# Patient Record
Sex: FEMALE | Race: WHITE
[De-identification: names, ages, dates, MRNs, and addresses within clinical notes are randomized per-mention and may not be internally consistent; named-entity substitution may affect disease eponyms.]

---

## 2019-04-11 ENCOUNTER — HOSPITAL ENCOUNTER (EMERGENCY)
Dept: HOSPITAL 97 - ER | Age: 59
Discharge: HOME | End: 2019-04-11
Payer: COMMERCIAL

## 2019-04-11 DIAGNOSIS — R91.8: ICD-10-CM

## 2019-04-11 DIAGNOSIS — Z88.0: ICD-10-CM

## 2019-04-11 DIAGNOSIS — J15.8: Primary | ICD-10-CM

## 2019-04-11 DIAGNOSIS — F17.210: ICD-10-CM

## 2019-04-11 LAB
ALBUMIN SERPL BCP-MCNC: 3.5 G/DL (ref 3.4–5)
ALP SERPL-CCNC: 129 U/L (ref 45–117)
ALT SERPL W P-5'-P-CCNC: 21 U/L (ref 12–78)
AST SERPL W P-5'-P-CCNC: 16 U/L (ref 15–37)
BUN BLD-MCNC: 16 MG/DL (ref 7–18)
GLUCOSE SERPLBLD-MCNC: 115 MG/DL (ref 74–106)
HCT VFR BLD CALC: 36 % (ref 36–45)
INR BLD: 1.14
LYMPHOCYTES # SPEC AUTO: 1.3 K/UL (ref 0.7–4.9)
MAGNESIUM SERPL-MCNC: 2.2 MG/DL (ref 1.8–2.4)
NT-PROBNP SERPL-MCNC: 662 PG/ML (ref ?–125)
PMV BLD: 8.5 FL (ref 7.6–11.3)
POTASSIUM SERPL-SCNC: 3.5 MMOL/L (ref 3.5–5.1)
RBC # BLD: 4.33 M/UL (ref 3.86–4.86)
TROPONIN (EMERG DEPT USE ONLY): < 0.02 NG/ML (ref 0–0.04)

## 2019-04-11 PROCEDURE — 71046 X-RAY EXAM CHEST 2 VIEWS: CPT

## 2019-04-11 PROCEDURE — 80076 HEPATIC FUNCTION PANEL: CPT

## 2019-04-11 PROCEDURE — 94640 AIRWAY INHALATION TREATMENT: CPT

## 2019-04-11 PROCEDURE — 81003 URINALYSIS AUTO W/O SCOPE: CPT

## 2019-04-11 PROCEDURE — 85610 PROTHROMBIN TIME: CPT

## 2019-04-11 PROCEDURE — 83735 ASSAY OF MAGNESIUM: CPT

## 2019-04-11 PROCEDURE — 85025 COMPLETE CBC W/AUTO DIFF WBC: CPT

## 2019-04-11 PROCEDURE — 93005 ELECTROCARDIOGRAM TRACING: CPT

## 2019-04-11 PROCEDURE — 96361 HYDRATE IV INFUSION ADD-ON: CPT

## 2019-04-11 PROCEDURE — 96374 THER/PROPH/DIAG INJ IV PUSH: CPT

## 2019-04-11 PROCEDURE — 80048 BASIC METABOLIC PNL TOTAL CA: CPT

## 2019-04-11 PROCEDURE — 84484 ASSAY OF TROPONIN QUANT: CPT

## 2019-04-11 PROCEDURE — 36415 COLL VENOUS BLD VENIPUNCTURE: CPT

## 2019-04-11 PROCEDURE — 99285 EMERGENCY DEPT VISIT HI MDM: CPT

## 2019-04-11 PROCEDURE — 71275 CT ANGIOGRAPHY CHEST: CPT

## 2019-04-11 PROCEDURE — 85379 FIBRIN DEGRADATION QUANT: CPT

## 2019-04-11 PROCEDURE — 81025 URINE PREGNANCY TEST: CPT

## 2019-04-11 PROCEDURE — 83880 ASSAY OF NATRIURETIC PEPTIDE: CPT

## 2019-04-11 NOTE — EKG
Test Date:    2019-04-11               Test Time:    13:38:21

Technician:   JONEL                                     

                                                     

MEASUREMENT RESULTS:                                       

Intervals:                                           

Rate:         83                                     

OH:           104                                    

QRSD:         88                                     

QT:           360                                    

QTc:          423                                    

Axis:                                                

P:            40                                     

OH:           104                                    

QRS:          69                                     

T:            49                                     

                                                     

INTERPRETIVE STATEMENTS:                                       

                                                     

Sinus rhythm with short OH

Otherwise normal ECG

Compared to ECG 01/31/2011 14:34:09

Ventricular premature complex(es) no longer present

T-wave abnormality no longer present



Electronically Signed On 04-11-19 17:12:58 CDT by Maxime Solomon

## 2019-04-11 NOTE — RAD REPORT
EXAM DESCRIPTION:  Sanjiv Pa And Lat (2 Views)4/11/2019 3:13 pm

 

CLINICAL HISTORY:  Cough

 

COMPARISON:  March 2018

 

FINDINGS:  Right middle lobe opacity has decreased in size. Mild right upper lobe opacity has develop
ed.

 

Right hilar and mediastinal lymphadenopathy present.

 

The heart is normal size

 

IMPRESSION:  Right lung opacities likely represent pneumonia.

 

Right hilar and mediastinal lymphadenopathy may be secondary to neoplasm, inflammation or infection

## 2019-04-11 NOTE — EDPHYS
Physician Documentation                                                                           

 [facility]                                                                 

Name: Radha Nieves                                                                               

Age: 58 yrs                                                                                       

Sex: Female                                                                                       

: 1960                                                                                   

MRN: [id number]                                                                                   

Arrival Date: 2019                                                                          

Time: 12:15                                                                                       

Account#: [id number]                                                                            

Bed 26                                                                                            

Private MD:                                                                                       

ED Physician David Gil                                                                         

HPI:                                                                                              

                                                                                             

13:25 This 58 yrs old  Female presents to ER via Ambulatory with complaints of       cp  

      Cough, Chest Congestion.                                                                    

13:25 The patient or guardian reports cough, that is intermittent, with productive sputum,    cp  

      that is green, blood streaked.                                                              

13:25 Onset: The symptoms/episode began/occurred 2 month(s) ago. Severity of symptoms: in the cp  

      emergency department the symptoms are actually worse, mildly. Associated signs and          

      symptoms: Pertinent positives: chest pain, with cough, shortness of breath. Patient         

      reports she has bonny her PCP, DR Lieberman, for same complaints and has been on antibiotics       

      and steroids to include cefdinir. Patient reports she is currently taking Levaquin and      

      oral steroids and last dose is tomorrow.                                                    

                                                                                                  

Historical:                                                                                       

- Allergies:                                                                                      

12:32 PENICILLINS;                                                                            sv  

- PMHx:                                                                                           

12:32 Thyroid problem;                                                                        sv  

- PSHx:                                                                                           

12:32 Hysterectomy; Cholecystectomy; spinal cyst removed;                                     sv  

                                                                                                  

- Immunization history:: Adult Immunizations up to date.                                          

- Social history:: Smoking status: Patient uses tobacco products, smokes one pack                 

  cigarettes per day.                                                                             

- Ebola Screening: : No symptoms or risks identified at this time.                                

                                                                                                  

                                                                                                  

ROS:                                                                                              

13:30 Constitutional: Negative for body aches, chills, fever, poor PO intake.                 cp  

13:30 Eyes: Negative for injury, pain, redness, and discharge.                                cp  

13:30 ENT: Negative for drainage from ear(s), ear pain, sore throat, difficulty swallowing,       

      difficulty handling secretions.                                                             

13:30 Cardiovascular: Positive for chest pain, with cough, Negative for edema, palpitations.      

13:30 Respiratory: Positive for cough, green and blood streaked, shortness of breath, on          

      exertion.                                                                                   

13:30 Abdomen/GI: Negative for abdominal pain, nausea, vomiting, and diarrhea, constipation.      

13:30 Back: Negative for pain at rest, pain with movement.                                        

13:30 : Negative for urinary symptoms.                                                          

13:30 Skin: Negative for cellulitis, rash.                                                        

13:30 Neuro: Negative for altered mental status, headache, weakness.                              

13:30 All other systems are negative.                                                             

                                                                                                  

Exam:                                                                                             

13:40 Constitutional: The patient appears in no acute distress, alert, awake,                 cp  

      non-diaphoretic, non-toxic, well developed, well nourished.                                 

13:40 Head/Face:  Normocephalic, atraumatic. Eyes:  Pupils equal round and reactive to light, cp  

      extra-ocular motions intact.  Lids and lashes normal.  Conjunctiva and sclera are           

      non-icteric and not injected.  Cornea within normal limits.  Periorbital areas with no      

      swelling, redness, or edema. ENT:  Nares patent. No nasal discharge, no septal              

      abnormalities noted.  Tympanic membranes are normal and external auditory canals are        

      clear.  Oropharynx with no redness, swelling, or masses, exudates, or evidence of           

      obstruction, uvula midline.  Mucous membranes moist.                                        

13:40 Neck: ROM/movement: is normal, is supple, without pain, no range of motions                 

      limitations, no meningismus, no nuchal rigidity.                                            

13:40 Chest/axilla: Inspection: normal, Palpation: is normal, no crepitus, no tenderness.         

13:40 Cardiovascular: Rate: normal, Rhythm: regular, Pulses: Pulses are 2+ in right radial        

      artery and left radial artery. Heart sounds: murmur, not appreciated, Edema: is not         

      appreciated, JVD: is not appreciated.                                                       

13:40 Respiratory: the patient does not display signs of respiratory distress,  Respirations:     

      labored breathing, is not present, intercostal retractions, are absent, splinting, is       

      not noted, tachypnea, is not appreciated, Breath sounds: bronchial sounds, that are         

      mild, are heard diffusely, decreased breath sounds, are not appreciated, stridor, is        

      not appreciated, wheezing: that is mild.                                                    

13:40 Abdomen/GI: Inspection: abdomen appears normal, Palpation: abdomen is soft and              

      non-tender, in all quadrants.                                                               

13:40 Back: pain, is absent, ROM is normal.                                                       

13:40 Skin: no rash present.                                                                      

13:40 Neuro: Orientation: to person, place \T\ time. Mentation: is normal, Cerebellar function:   

      is grossly normal, Motor: moves all fours, strength is normal, Sensation: is normal.        

13:45 ECG was reviewed by the Attending Physician.                                            cp  

                                                                                                  

Vital Signs:                                                                                      

12:32  / 65; Pulse 99; Resp 24; Temp 98.7(O); Pulse Ox 99% ; Weight 70.31 kg; Height 5  sv  

      ft. 7 in. (170.18 cm); Pain 4/10;                                                           

13:30  / 71; Pulse 91; Resp 19 S; Pulse Ox 100% on R/A;                                 ca1 

14:20  / 51; Pulse 76; Resp 16 S; Pulse Ox 100% on R/A;                                 ca1 

16:02  / 61; Pulse 100; Resp 19 S; Pulse Ox 97% on R/A;                                 ca1 

16:38 BP 96 / 50; Pulse 98; Resp 19 S; Pulse Ox 99% on R/A;                                   ca1 

16:43  / 56; Pulse 102; Resp 19 S; Pulse Ox 100% on R/A;                                ca1 

17:10  / 61; Pulse 100; Resp 16 S; Pulse Ox 100% on R/A;                                ca1 

17:30  / 62; Pulse 99; Resp 19 S; Pulse Ox 99% on R/A;                                  ca1 

12:32 Body Mass Index 24.28 (70.31 kg, 170.18 cm)                                             sv  

                                                                                                  

MDM:                                                                                              

13:05 Patient medically screened.                                                             cp  

13:45 Differential Diagnosis: Bronchitis Influenza Asthma Exacerbation Pneumonia Other lung   cp  

      carcinoma, pulmonary embolism.                                                              

16:25 Data reviewed: vital signs, nurses notes, lab test result(s), EKG, radiologic studies,  cp  

      CT scan, plain films.                                                                       

16:25 Test interpretation: by ED physician or midlevel provider: ECG, plain radiologic        cp  

      studies.                                                                                    

17:00 Physician consultation: Corazon Serrano MD was called at 16:45, was contacted at 17:00,   cp  

      regarding admission, to the medical/surgical unit. patient's condition, reports patient     

      can f/u Monday with DR Jeter to discuss today's findings in office on Monday,            

      4-. Dr Jeter recommends referral to cardiothoracic surgery to perform biopsy      

      of bronchial mass.                                                                          

17:20 Response to treatment: the patient's symptoms have mildly improved after treatment.     cp  

17:20 ED course: VSS. Discussed results of today's labs and radiology tests and need for      cp  

      urgent f/u. Patient requests discharge to home instead of an attempt to transfer to [facility] in the [facility] for continued care. Will prescribe oral clindamycin and        

      discharge to home for continued monitoring.                                                 

                                                                                                  

                                                                                             

13:19 Order name: Basic Metabolic Panel; Complete Time: 14:39                                 cp  

                                                                                             

14:39 Interpretation: Normal except: GLUC 115; GFR 61.                                        cp  

                                                                                             

13:19 Order name: CBC with Diff; Complete Time: 14:39                                         cp  

                                                                                             

14:39 Interpretation: Normal except: WBC 11.9; HGB 11.8; ; MARCOS% 85.4; LYM% 10.7; NEUT  cp  

      A 10.1.                                                                                     

                                                                                             

13:19 Order name: LFT's; Complete Time: 14:39                                                 cp  

                                                                                             

13:19 Order name: Magnesium; Complete Time: 14:39                                             cp  

                                                                                             

13:19 Order name: NT PRO-BNP; Complete Time: 14:39                                            cp  

                                                                                             

13:19 Order name: PT-INR; Complete Time: 14:39                                                cp  

                                                                                             

13:19 Order name: Troponin (emerg Dept Use Only); Complete Time: 14:39                        cp  

                                                                                             

13:19 Order name: D-Dimer; Complete Time: 14:39                                               cp  

                                                                                             

14:40 Order name: CT Chest For PE Angio; Complete Time: 16:21                                 cp  

                                                                                             

15:09 Order name: Chest Pa And Lat (2 Views); Complete Time: 16:21                            EDMS

                                                                                             

15:42 Order name: Urine Dipstick--Ancillary (enter results); Complete Time: 16:21             eb  

                                                                                             

15:42 Order name: Urine Pregnancy--Ancillary (enter results); Complete Time: 16:21            eb  

                                                                                             

13:19 Order name: EKG; Complete Time: 13:20                                                   cp  

                                                                                             

13:19 Order name: Cardiac monitoring; Complete Time: 13:34                                    cp  

                                                                                             

13:19 Order name: EKG - Nurse/Tech; Complete Time: 13:42                                      cp  

                                                                                             

13:19 Order name: IV Saline Lock; Complete Time: 13:34                                        cp  

                                                                                             

13:19 Order name: Labs collected and sent; Complete Time: 13:34                               cp  

                                                                                             

13:19 Order name: O2 Per Protocol; Complete Time: 13:34                                       cp  

                                                                                             

13:19 Order name: O2 Sat Monitoring; Complete Time: 13:34                                     cp  

                                                                                                  

EC:45 Rate is 83 beats/min. Rhythm is regular. ID interval is normal. QRS interval is normal. cp  

      QT interval is normal. Interpreted by me. Reviewed by me.                                   

                                                                                                  

Administered Medications:                                                                         

13:22 Drug: Albuterol - atroVENT (3:1) (2.5 mg - 0.5 mg) 3 ml Route: Nebulizer;               ca1 

14:30 Follow up: Response: No adverse reaction; Marked relief of symptoms                     ca1 

13:30 Drug: NS 0.9% 1000 ml Route: IV; Rate: 1 bolus; Site: right antecubital;                ca1 

15:00 Follow up: Response: No adverse reaction; IV Status: Completed infusion                 ca1 

13:30 Drug: SOLU-Medrol 60 mg Route: IVP; Site: right antecubital;                            ca1 

15:00 Follow up: Response: No adverse reaction; Marked relief of symptoms                     ca1 

                                                                                                  

                                                                                                  

Disposition:                                                                                      

19:06 Co-signature as Attending Physician, David Gil MD.                                    rn  

                                                                                                  

Disposition:                                                                                      

19 17:22 Discharged to Home. Impression: Pneumonia due to other specified bacteria -        

  post-obstructive, Right hilar mass.                                                             

- Condition is Stable.                                                                            

- Discharge Instructions: Community-Acquired Pneumonia, Adult.                                    

- Prescriptions for Clindamycin HCl 300 mg Oral Capsule - take 1 capsule by ORAL route            

  every 6 hours for 10 days; 40 capsule. Albuterol Sulfate 2.5 mg /3 mL (0.083 %)                 

  Inhalation Solution for Nebulization - inhale 1 unit by NEBULIZATION route every 8              

  hours As needed; 1 box.                                                                         

- Medication Reconciliation Form, Thank You Letter, Antibiotic Education, Prescription            

  Opioid Use form.                                                                                

- Follow up: Can Jeter MD; When: 04/15/2019; Reason: Recheck today's complaints.          

- Problem is an ongoing problem.                                                                  

- Symptoms have improved.                                                                         

- Notes: call office of DR Ramesh Sherwood, cardiothoracic [facility], \T\[phone] to             

  schedule follow-up appointment for right hilar mass                                             

                                                                                                  

                                                                                                  

Signatures:                                                                                       

Dispatcher [employer]                           EDGeri Garcias RN RN sv Nieto, Roman, MD MD rn Page, Corey, PA PA cp Acob, Cheryl, RN                        RN   ca1                                                  

                                                                                                  

Corrections: (The following items were deleted from the chart)                                    

13:21 12:35 Chest Pa And Lat (2 Views)+RAD.RAD.BRZ ordered. EDMS                              EDMS

15:09 14:40 Chest Single View+RAD.RAD.BRZ ordered. EDMS                                       EDMS

17:26 17:22 2019 17:22 Discharged to Home. Impression: Pneumonia due to other specified cp  

      bacteria - post-obstructive. Condition is Stable. Forms are Medication Reconciliation       

      Form, Thank You Letter, Antibiotic Education, Prescription Opioid Use. Follow up:           

      Can Jeter; When: 04/15/2019; Reason: Recheck today's complaints. Problem is an        

      ongoing problem. Symptoms have improved. cp                                                 

17:46 17:26 2019 17:22 Discharged to Home. Impression: Pneumonia due to other specified ca1 

      bacteria - post-obstructive; Right hilar mass. Condition is Stable. Discharge               

      Instructions: Community-Acquired Pneumonia, Adult. Prescriptions for Clindamycin HCl        

      300 mg Oral Capsule - take 1 capsule by ORAL route every 6 hours for 10 days; 40            

      capsule, Albuterol Sulfate 2.5 mg /3 mL (0.083 %) Inhalation Solution for Nebulization      

      - inhale 1 unit by NEBULIZATION route every 8 hours As needed; 1 box. and Forms are         

      Medication Reconciliation Form, Thank You Letter, Antibiotic Education, Prescription        

      Opioid Use. Follow up: Can Jeter; When: 04/15/2019; Reason: Recheck today's           

      complaints. Problem is an ongoing problem. Symptoms have improved. cp                       

                                                                                                  

**************************************************************************************************

## 2019-04-11 NOTE — RAD REPORT
EXAM DESCRIPTION:  CT - Chest For Pe Angio - 4/11/2019 3:00 pm

 

CLINICAL HISTORY:  Chest pain.

Cough;SOB

 

COMPARISON:  Chest Pa And Lat (2 Views) dated 3/22/2019

 

TECHNIQUE:  CT angiogram of the pulmonary arteries was performed with MIP.

 

All CT scans are performed using dose optimization technique as appropriate and may include automated
 exposure control or mA/KV adjustment according to patient size.

 

FINDINGS:  No evidence of pulmonary thromboembolism.

 

No acute aortic finding demonstrated.

 

No bulky lymphadenopathy is seen in the chest, including the peritracheal region measuring 18 mm, pre
tracheal station measuring 20 x 32 mm, sub-carinal station measuring 5.0 x 3.2 cm, and right hilar re
gion measuring 6.0 x 4.5 cm. Occlusion of the right middle lobe bronchus is noted by the right sided 
hilar soft tissue conglomerate.

 

Patchy airspace opacity is present in the right middle lobe most compatible pneumonia, postobstructiv
e in etiology. The lungs elsewhere are grossly clear of acute infiltrate.

 

No significant pericardial or pleural fluid.

 

No lytic or blastic bone lesion.

 

IMPRESSION:  No evidence of pulmonary thromboembolism.

 

Significant bulky lymphadenopathy is seen in the chest, greatest in the right hilar region. Different
ial considerations include lymphoma and metastatic disease. Non-neoplastic etiologies such as sarcoid
osis or tuberculosis are felt to be less likely.

 

Airspace opacity in the right middle lobe likely represents postobstructive pneumonia. The right midd
le lobe bronchus appears occluded by the large right hilar soft tissue conglomerate.

## 2019-04-11 NOTE — ER
Nurse's Notes                                                                                     

 [facility]                                                                 

Name: Radha Nieves                                                                               

Age: 58 yrs                                                                                       

Sex: Female                                                                                       

: 1960                                                                                   

MRN: [id number]                                                                                   

Arrival Date: 2019                                                                          

Time: 12:15                                                                                       

Account#: [id number]                                                                            

Bed 26                                                                                            

Private MD:                                                                                       

Diagnosis: Pneumonia due to other specified bacteria-post-obstructive;Right hilar mass            

                                                                                                  

Presentation:                                                                                     

                                                                                             

12:29 Presenting complaint: Patient states: was seen here 3/22/19 and dx with pneumonia, has  sv  

      f/u with Dr Lieberman and was first placed on Cefdinir and then had no improvement so then        

      she saw him again and was placed on Levaquin and last dose is tomorrow. Reports             

      increased cough/SOB/phlegm is green and blood streaked. Transition of care: patient was     

      not received from another setting of care. Onset of symptoms was 2019. Care prior     

      to arrival: None.                                                                           

12:29 Method Of Arrival: Ambulatory                                                           sv  

12:29 Acuity: MALOU 3                                                                           sv  

13:06 Risk Assessment: Do you want to hurt yourself or someone else? Patient reports no       ca1 

      desire to harm self or others.                                                              

13:06 Initial Sepsis Screen: Does the patient meet any 2 criteria? No. Patient's initial      ca1 

      sepsis screen is negative. Does the patient have a suspected source of infection? Yes:      

      Productive cough/pneumonia.                                                                 

                                                                                                  

Triage Assessment:                                                                                

12:34 General: Appears in no apparent distress. uncomfortable, well developed, Behavior is    sv  

      calm, cooperative, appropriate for age. Pain: Complains of pain in chest. Neuro: Level      

      of Consciousness is awake, alert, obeys commands, Oriented to person, place, time,          

      situation, Moves all extremities. Full function. Respiratory: Reports shortness of          

      breath on exertion cough that is productive, persistent pain with cough Airway is           

      patent Respiratory effort is even, unlabored, Respiratory pattern is regular, tachypnea.    

                                                                                                  

Historical:                                                                                       

- Allergies:                                                                                      

12:32 PENICILLINS;                                                                            sv  

- PMHx:                                                                                           

12:32 Thyroid problem;                                                                        sv  

- PSHx:                                                                                           

12:32 Hysterectomy; Cholecystectomy; spinal cyst removed;                                     sv  

                                                                                                  

- Immunization history:: Adult Immunizations up to date.                                          

- Social history:: Smoking status: Patient uses tobacco products, smokes one pack                 

  cigarettes per day.                                                                             

- Ebola Screening: : No symptoms or risks identified at this time.                                

                                                                                                  

                                                                                                  

Screenin:06 Abuse screen: Denies threats or abuse. Denies injuries from another. Nutritional        ca1 

      screening: No deficits noted. Tuberculosis screening: No symptoms or risk factors           

      identified. Fall Risk None identified.                                                      

                                                                                                  

Assessment:                                                                                       

13:06 General: Appears in no apparent distress. comfortable, Behavior is calm, cooperative,   ca1 

      appropriate for age. Pain: Denies pain. Neuro: Level of Consciousness is awake, alert,      

      obeys commands, Oriented to person, place, time, situation. Cardiovascular: Heart tones     

      S1 S2 S4 Capillary refill < 3 seconds Patient's skin is warm and dry. Respiratory:          

      Reports cough that is productive, persistent Airway is patent Respiratory effort is         

      even, unlabored, Respiratory pattern is regular, symmetrical, Breath sounds are clear       

      bilaterally. GI: Abdomen is flat, non-distended, Bowel sounds present X 4 quads. Abd is     

      soft and non tender X 4 quads. : No deficits noted. No signs and/or symptoms were         

      reported regarding the genitourinary system. EENT: No deficits noted. No signs and/or       

      symptoms were reported regarding the EENT system. Derm: Skin is intact, is healthy with     

      good turgor, Skin is pink, warm \T\ dry. Musculoskeletal: Circulation, motion, and          

      sensation intact. Capillary refill < 3 seconds.                                             

14:08 Reassessment: Patient appears in no apparent distress at this time. Patient and/or      ca1 

      family updated on plan of care and expected duration. Pain level reassessed. Patient is     

      alert, oriented x 3, equal unlabored respirations, skin warm/dry/pink.                      

15:00 Reassessment: Patient appears in no apparent distress at this time. Patient and/or      ca1 

      family updated on plan of care and expected duration. Pain level reassessed. Patient is     

      alert, oriented x 3, equal unlabored respirations, skin warm/dry/pink.                      

15:56 Reassessment: Patient appears in no apparent distress at this time. Patient and/or      ca1 

      family updated on plan of care and expected duration. Pain level reassessed. Patient is     

      alert, oriented x 3, equal unlabored respirations, skin warm/dry/pink.                      

16:38 Reassessment: Patient appears in no apparent distress at this time. Patient is alert,   ca1 

      oriented x 3, equal unlabored respirations, skin warm/dry/pink. Patient states feeling      

      better.                                                                                     

17:30 Reassessment: Patient appears in no apparent distress at this time. Patient is alert,   ca1 

      oriented x 3, equal unlabored respirations, skin warm/dry/pink. Patient states symptoms     

      have improved.                                                                              

                                                                                                  

Vital Signs:                                                                                      

12:32  / 65; Pulse 99; Resp 24; Temp 98.7(O); Pulse Ox 99% ; Weight 70.31 kg; Height 5  sv  

      ft. 7 in. (170.18 cm); Pain 4/10;                                                           

13:30  / 71; Pulse 91; Resp 19 S; Pulse Ox 100% on R/A;                                 ca1 

14:20  / 51; Pulse 76; Resp 16 S; Pulse Ox 100% on R/A;                                 ca1 

16:02  / 61; Pulse 100; Resp 19 S; Pulse Ox 97% on R/A;                                 ca1 

16:38 BP 96 / 50; Pulse 98; Resp 19 S; Pulse Ox 99% on R/A;                                   ca1 

16:43  / 56; Pulse 102; Resp 19 S; Pulse Ox 100% on R/A;                                ca1 

17:10  / 61; Pulse 100; Resp 16 S; Pulse Ox 100% on R/A;                                ca1 

17:30  / 62; Pulse 99; Resp 19 S; Pulse Ox 99% on R/A;                                  ca1 

12:32 Body Mass Index 24.28 (70.31 kg, 170.18 cm)                                             sv  

                                                                                                  

ED Course:                                                                                        

12:15 Patient arrived in ED.                                                                  tw3 

12:32 Triage completed.                                                                       sv  

12:33 Arm band placed on.                                                                     sv  

13:05 Omkar Christina PA is PHCP.                                                                cp  

13:05 David Gil MD is Attending Physician.                                                cp  

13:06 Patient has correct armband on for positive identification. Placed in gown. Bed in low  ca1 

      position. Call light in reach. Side rails up X 1. Cardiac monitor on. Pulse ox on. NIBP     

      on. Warm blanket given.                                                                     

13:06 No provider procedures requiring assistance completed.                                  ca1 

13:12 Patricia Hahn, RN is Primary Nurse.                                                      ca1 

13:36 Initial lab(s) drawn, by me, sent to lab. Inserted saline lock: 20 gauge in right       lt1 

      antecubital area, using aseptic technique.                                                  

14:34 Notified Nurse Practitioner and/or Physician Assistant of a critical lab result(s),     dm5 

      D-dimer 1211.                                                                               

15:00 CT Chest For PE Angio In Process Unspecified.                                           EDMS

15:10 Chest Pa And Lat (2 Views) In Process Unspecified.                                      EDMS

15:11 X-ray completed. Patient tolerated procedure well. Patient moved to radiology via       mh1 

      wheelchair. Patient moved back from radiology.                                              

17:22 Can Jeter MD is Referral Physician.                                             cp  

17:44 IV discontinued, intact, bleeding controlled, No redness/swelling at site. Pressure     ca1 

      dressing applied.                                                                           

                                                                                                  

Administered Medications:                                                                         

13:22 Drug: Albuterol - atroVENT (3:1) (2.5 mg - 0.5 mg) 3 ml Route: Nebulizer;               ca1 

14:30 Follow up: Response: No adverse reaction; Marked relief of symptoms                     ca1 

13:30 Drug: NS 0.9% 1000 ml Route: IV; Rate: 1 bolus; Site: right antecubital;                ca1 

15:00 Follow up: Response: No adverse reaction; IV Status: Completed infusion                 ca1 

13:30 Drug: SOLU-Medrol 60 mg Route: IVP; Site: right antecubital;                            ca1 

15:00 Follow up: Response: No adverse reaction; Marked relief of symptoms                     ca1 

                                                                                                  

                                                                                                  

Outcome:                                                                                          

17:22 Discharge ordered by MD.                                                                cp  

17:45 Discharged to home ambulatory.                                                          ca1 

17:45 Condition: stable                                                                           

17:45 Discharge instructions given to patient, Instructed on discharge instructions, follow       

      up and referral plans. medication usage, Demonstrated understanding of instructions,        

      follow-up care, medications, Prescriptions given X 2.                                       

17:46 Patient left the ED.                                                                    ca1 

                                                                                                  

Signatures:                                                                                       

Dispatcher [employer]                           EDMS                                                 

Lindsay Serrano RN RN dm5 Verde, Stephanie, RN RN sv Harvey, Martha                               1                                                  

Omkar Christina PA PA cp Wade, Marleen                                    3                                                  

Patricia Hahn RN RN   ca1                                                  

Amanda Su                                   [facility]                                                  

                                                                                                  

**************************************************************************************************